# Patient Record
Sex: FEMALE | Race: WHITE | NOT HISPANIC OR LATINO | Employment: UNEMPLOYED | URBAN - METROPOLITAN AREA
[De-identification: names, ages, dates, MRNs, and addresses within clinical notes are randomized per-mention and may not be internally consistent; named-entity substitution may affect disease eponyms.]

---

## 2023-10-03 ENCOUNTER — APPOINTMENT (OUTPATIENT)
Dept: PRIMARY CARE | Facility: CLINIC | Age: 48
End: 2023-10-03
Payer: OTHER GOVERNMENT

## 2023-11-01 ENCOUNTER — OFFICE VISIT (OUTPATIENT)
Dept: PRIMARY CARE | Facility: CLINIC | Age: 48
End: 2023-11-01
Payer: OTHER GOVERNMENT

## 2023-11-01 VITALS
HEART RATE: 78 BPM | OXYGEN SATURATION: 98 % | WEIGHT: 137 LBS | SYSTOLIC BLOOD PRESSURE: 122 MMHG | TEMPERATURE: 97.7 F | DIASTOLIC BLOOD PRESSURE: 80 MMHG | HEIGHT: 63 IN | BODY MASS INDEX: 24.27 KG/M2

## 2023-11-01 DIAGNOSIS — Z12.31 ENCOUNTER FOR SCREENING MAMMOGRAM FOR MALIGNANT NEOPLASM OF BREAST: ICD-10-CM

## 2023-11-01 DIAGNOSIS — L98.9 BACK SKIN LESION: ICD-10-CM

## 2023-11-01 DIAGNOSIS — E78.5 HYPERLIPIDEMIA, UNSPECIFIED HYPERLIPIDEMIA TYPE: ICD-10-CM

## 2023-11-01 DIAGNOSIS — H02.849 SWELLING OF EYELID, UNSPECIFIED LATERALITY: ICD-10-CM

## 2023-11-01 DIAGNOSIS — R09.81 SINUS CONGESTION: ICD-10-CM

## 2023-11-01 DIAGNOSIS — R41.3 MEMORY CHANGES: ICD-10-CM

## 2023-11-01 DIAGNOSIS — Z13.1 SCREENING FOR DIABETES MELLITUS: ICD-10-CM

## 2023-11-01 DIAGNOSIS — Z00.00 WELLNESS EXAMINATION: Primary | ICD-10-CM

## 2023-11-01 PROBLEM — J45.20 INTRINSIC ASTHMA WITHOUT STATUS ASTHMATICUS (HHS-HCC): Status: ACTIVE | Noted: 2022-11-08

## 2023-11-01 PROBLEM — E61.1 IRON DEFICIENCY: Status: ACTIVE | Noted: 2022-11-08

## 2023-11-01 PROBLEM — D64.9 ANEMIA: Status: ACTIVE | Noted: 2022-11-08

## 2023-11-01 PROBLEM — E61.1 IRON DEFICIENCY: Status: RESOLVED | Noted: 2022-11-08 | Resolved: 2023-11-01

## 2023-11-01 PROBLEM — K58.1 IRRITABLE BOWEL SYNDROME WITH CONSTIPATION: Status: ACTIVE | Noted: 2022-11-08

## 2023-11-01 PROBLEM — E28.2 POLYCYSTIC OVARY SYNDROME: Status: ACTIVE | Noted: 2022-11-08

## 2023-11-01 PROBLEM — D64.9 ANEMIA: Status: RESOLVED | Noted: 2022-11-08 | Resolved: 2023-11-01

## 2023-11-01 PROCEDURE — 1036F TOBACCO NON-USER: CPT | Performed by: FAMILY MEDICINE

## 2023-11-01 PROCEDURE — 99396 PREV VISIT EST AGE 40-64: CPT | Performed by: FAMILY MEDICINE

## 2023-11-01 PROCEDURE — 99214 OFFICE O/P EST MOD 30 MIN: CPT | Performed by: FAMILY MEDICINE

## 2023-11-01 RX ORDER — BISMUTH SUBSALICYLATE 262 MG
1 TABLET,CHEWABLE ORAL DAILY
COMMUNITY

## 2023-11-01 RX ORDER — DIPHENHYDRAMINE HCL 25 MG
25-50 TABLET ORAL EVERY 4 HOURS PRN
Qty: 30 TABLET | Refills: 3 | Status: SHIPPED | OUTPATIENT
Start: 2023-11-01

## 2023-11-01 RX ORDER — CETIRIZINE HYDROCHLORIDE 10 MG/1
10 TABLET ORAL DAILY
Qty: 90 TABLET | Refills: 3 | Status: SHIPPED | OUTPATIENT
Start: 2023-11-01

## 2023-11-01 ASSESSMENT — ENCOUNTER SYMPTOMS
EYE REDNESS: 0
EYE DISCHARGE: 0
CHILLS: 0
VOICE CHANGE: 0
DIARRHEA: 0
EYE ITCHING: 0
VOMITING: 0
COUGH: 0
SORE THROAT: 0
FATIGUE: 0
DYSURIA: 0
RHINORRHEA: 0
SHORTNESS OF BREATH: 0
FEVER: 0
APPETITE CHANGE: 0
DIZZINESS: 0
CONSTIPATION: 0
PALPITATIONS: 0
SLEEP DISTURBANCE: 0
ABDOMINAL PAIN: 0
NAUSEA: 0

## 2023-11-01 NOTE — PROGRESS NOTES
"Subjective   Patient ID: Malena Person is a 47 y.o. female who presents for Annual Exam and Eye Pain (Bilat eye pain and swelling x 9 yrs off and on-Happened last night).    Malena presents for annual wellness exam and to address multiple concerns.     She has been having swelling in bilateral upper and lower eyelids. Happens intermittently. Seems to happen after being outside. Did see allergist in January of this year. Persistent symptoms despite allergy treatment. Also having intermittent pressure in sphenoid sinuses.     Also noticed that her memory is not as sharp. Sometimes can take her awhile to think of things. Also has been more stressed with possible move coming up.     Needs dermatology referral. Due for skin check.          Review of Systems   Constitutional:  Negative for appetite change, chills, fatigue and fever.   HENT:  Negative for congestion, rhinorrhea, sore throat and voice change.    Eyes:  Negative for discharge, redness, itching and visual disturbance.   Respiratory:  Negative for cough and shortness of breath.    Cardiovascular:  Negative for chest pain and palpitations.   Gastrointestinal:  Negative for abdominal pain, constipation, diarrhea, nausea and vomiting.   Genitourinary:  Negative for dysuria.   Skin:  Negative for rash.   Neurological:  Negative for dizziness.   Psychiatric/Behavioral:  Negative for sleep disturbance.        Objective   /80   Pulse 78   Temp 36.5 °C (97.7 °F)   Ht 1.6 m (5' 3\")   Wt 62.1 kg (137 lb)   SpO2 98%   BMI 24.27 kg/m²     Physical Exam  Constitutional:       General: She is not in acute distress.     Appearance: Normal appearance.   HENT:      Head: Normocephalic.      Nose: No congestion.      Mouth/Throat:      Mouth: Mucous membranes are moist.   Eyes:      Extraocular Movements: Extraocular movements intact.      Conjunctiva/sclera: Conjunctivae normal.   Cardiovascular:      Rate and Rhythm: Normal rate and regular rhythm.      " Heart sounds: No murmur heard.  Pulmonary:      Effort: Pulmonary effort is normal.      Breath sounds: No wheezing or rhonchi.   Abdominal:      Palpations: Abdomen is soft.      Tenderness: There is no abdominal tenderness.   Musculoskeletal:         General: No swelling or tenderness.   Skin:     General: Skin is warm and dry.   Neurological:      General: No focal deficit present.      Mental Status: She is alert.   Psychiatric:         Mood and Affect: Mood normal.         Behavior: Behavior normal.         Assessment/Plan   Diagnoses and all orders for this visit:  Wellness examination  Comments:  Mammogram ordered. Pap and colon cancer screening up-to-date.  Swelling of eyelid, unspecified laterality  Comments:  Possible allergic cause. Refer to ophthalmology to rule out other causes since limited improvement with allergy medication.  Orders:  -     Referral to Ophthalmology; Future  -     cetirizine (ZyrTEC) 10 mg tablet; Take 1 tablet (10 mg) by mouth once daily.  -     diphenhydrAMINE (Sominex) 25 mg tablet; Take 1-2 tablets (25-50 mg) by mouth every 4 hours if needed for allergies.  Sinus congestion  Comments:  Intermittent sinus issues. Unclear if related to eyelid swelling. Refer to ENT to see if sinusitis is contributing factor.  Orders:  -     Referral to ENT; Future  Back skin lesion  Comments:  Refer to deramtology for anual skin check. History biopsy of back skin lesions; being monitored.  Orders:  -     Referral to Dermatology  Memory changes  Comments:  Check TSH and B12 levels.  Orders:  -     Vitamin B12; Future  -     TSH with reflex to Free T4 if abnormal; Future  Hyperlipidemia, unspecified hyperlipidemia type  Comments:  Repeat lipid panel ordered.  Orders:  -     Lipid Panel; Future  Encounter for screening mammogram for malignant neoplasm of breast  -     BI mammo bilateral screening tomosynthesis; Future  Screening for diabetes mellitus  -     Comprehensive Metabolic Panel; Future

## 2023-11-07 ENCOUNTER — OFFICE VISIT (OUTPATIENT)
Dept: OPHTHALMOLOGY | Facility: CLINIC | Age: 48
End: 2023-11-07
Payer: OTHER GOVERNMENT

## 2023-11-07 DIAGNOSIS — H01.115 ALLERGIC DERMATITIS OF UPPER AND LOWER LIDS OF BOTH EYES: ICD-10-CM

## 2023-11-07 DIAGNOSIS — H01.112 ALLERGIC DERMATITIS OF UPPER AND LOWER LIDS OF BOTH EYES: ICD-10-CM

## 2023-11-07 DIAGNOSIS — H01.111 ALLERGIC DERMATITIS OF UPPER AND LOWER LIDS OF BOTH EYES: ICD-10-CM

## 2023-11-07 DIAGNOSIS — H02.849 SWELLING OF EYELID, UNSPECIFIED LATERALITY: Primary | ICD-10-CM

## 2023-11-07 DIAGNOSIS — H01.114 ALLERGIC DERMATITIS OF UPPER AND LOWER LIDS OF BOTH EYES: ICD-10-CM

## 2023-11-07 PROCEDURE — 99204 OFFICE O/P NEW MOD 45 MIN: CPT | Performed by: OPHTHALMOLOGY

## 2023-11-07 ASSESSMENT — CONF VISUAL FIELD
METHOD: COUNTING FINGERS
OD_INFERIOR_TEMPORAL_RESTRICTION: 0
OD_INFERIOR_NASAL_RESTRICTION: 0
OD_SUPERIOR_TEMPORAL_RESTRICTION: 0
OD_SUPERIOR_NASAL_RESTRICTION: 0
OS_NORMAL: 1
OS_SUPERIOR_TEMPORAL_RESTRICTION: 0
OD_NORMAL: 1
OS_INFERIOR_TEMPORAL_RESTRICTION: 0
OS_INFERIOR_NASAL_RESTRICTION: 0
OS_SUPERIOR_NASAL_RESTRICTION: 0

## 2023-11-07 ASSESSMENT — ENCOUNTER SYMPTOMS: EYES NEGATIVE: 1

## 2023-11-07 ASSESSMENT — TONOMETRY
OS_IOP_MMHG: 14
IOP_METHOD: TONOPEN
OD_IOP_MMHG: 14

## 2023-11-07 ASSESSMENT — SLIT LAMP EXAM - LIDS
COMMENTS: DERMATOCHALASIS - LOWER LID
COMMENTS: DERMATOCHALASIS - LOWER LID

## 2023-11-07 ASSESSMENT — VISUAL ACUITY
METHOD: SNELLEN - LINEAR
OS_SC: 20/30-2
OD_SC: 20/25

## 2023-11-07 ASSESSMENT — EXTERNAL EXAM - RIGHT EYE: OD_EXAM: NORMAL

## 2023-11-07 ASSESSMENT — EXTERNAL EXAM - LEFT EYE: OS_EXAM: NORMAL

## 2023-11-07 NOTE — PROGRESS NOTES
Assessment/Plan   Diagnoses and all orders for this visit:  Swelling of eyelid, unspecified laterality  -Bilateral upper and lower eyelid swelling that is episodic without known etiology  -Most likely allergic in nature. Prior TSH done in 2022 was within normal limits, no obvious proptosis on exam today to suggest JERSON as etiology.  -Recommend further evaluation with allergist or immunologist. Will refer patient to Oculoplastics surgeon Dr. Woody Gomes. She is scheduled with ENT later this week.

## 2023-11-07 NOTE — LETTER
November 7, 2023    Sherie Coronado,   [address]  [address]  [place] [address]    Patient: Malena Person   YOB: 1975   Date of Visit: 11/7/2023       Dear Dr. Sherie Coronado, DO:    Thank you for referring Malena Person to me for evaluation. Below are the relevant portions of the exam, along with my assessment and plan of care.    Vision readings for this visit:  Visual Acuity (Snellen - Linear)         Right Left    Dist sc 20/25 20/30-2          Tonometry (Tonopen, 1:32 PM)         Right Left    Pressure 14 14            Assessment/Plan:  Diagnoses and all orders for this visit:  Swelling of eyelid, unspecified laterality  -Bilateral upper and lower eyelid swelling that is episodic without known etiology  -Most likely allergic in nature. Prior TSH done in 2022 was within normal limits, no obvious proptosis on exam today to suggest JERSON as etiology.  -Recommend further evaluation with allergist or immunologist. Will refer patient to Oculoplastics surgeon Dr. Woody Gomes. She is scheduled with ENT later this week.        If you have questions, please do not hesitate to call me. I look forward to following Malnea along with you.         Sincerely,        Jayshree Dwyer MD        CC:   No Recipients

## 2023-11-09 ENCOUNTER — ANCILLARY PROCEDURE (OUTPATIENT)
Dept: RADIOLOGY | Facility: CLINIC | Age: 48
End: 2023-11-09
Payer: OTHER GOVERNMENT

## 2023-11-09 ENCOUNTER — LAB (OUTPATIENT)
Dept: LAB | Facility: LAB | Age: 48
End: 2023-11-09
Payer: OTHER GOVERNMENT

## 2023-11-09 VITALS — WEIGHT: 140 LBS | HEIGHT: 63 IN | BODY MASS INDEX: 24.8 KG/M2

## 2023-11-09 DIAGNOSIS — Z13.1 SCREENING FOR DIABETES MELLITUS: ICD-10-CM

## 2023-11-09 DIAGNOSIS — Z12.31 ENCOUNTER FOR SCREENING MAMMOGRAM FOR MALIGNANT NEOPLASM OF BREAST: ICD-10-CM

## 2023-11-09 DIAGNOSIS — E78.5 HYPERLIPIDEMIA, UNSPECIFIED HYPERLIPIDEMIA TYPE: ICD-10-CM

## 2023-11-09 DIAGNOSIS — R41.3 MEMORY CHANGES: ICD-10-CM

## 2023-11-09 LAB
ALBUMIN SERPL BCP-MCNC: 4.1 G/DL (ref 3.4–5)
ALP SERPL-CCNC: 41 U/L (ref 33–110)
ALT SERPL W P-5'-P-CCNC: 11 U/L (ref 7–45)
ANION GAP SERPL CALC-SCNC: 9 MMOL/L (ref 10–20)
AST SERPL W P-5'-P-CCNC: 17 U/L (ref 9–39)
BILIRUB SERPL-MCNC: 0.5 MG/DL (ref 0–1.2)
BUN SERPL-MCNC: 15 MG/DL (ref 6–23)
CALCIUM SERPL-MCNC: 8.8 MG/DL (ref 8.6–10.3)
CHLORIDE SERPL-SCNC: 105 MMOL/L (ref 98–107)
CHOLEST SERPL-MCNC: 183 MG/DL (ref 0–199)
CHOLESTEROL/HDL RATIO: 2.9
CO2 SERPL-SCNC: 28 MMOL/L (ref 21–32)
CREAT SERPL-MCNC: 0.75 MG/DL (ref 0.5–1.05)
GFR SERPL CREATININE-BSD FRML MDRD: >90 ML/MIN/1.73M*2
GLUCOSE SERPL-MCNC: 81 MG/DL (ref 74–99)
HDLC SERPL-MCNC: 62.9 MG/DL
LDLC SERPL CALC-MCNC: 107 MG/DL
NON HDL CHOLESTEROL: 120 MG/DL (ref 0–149)
POTASSIUM SERPL-SCNC: 4 MMOL/L (ref 3.5–5.3)
PROT SERPL-MCNC: 6.2 G/DL (ref 6.4–8.2)
SODIUM SERPL-SCNC: 138 MMOL/L (ref 136–145)
TRIGL SERPL-MCNC: 68 MG/DL (ref 0–149)
TSH SERPL-ACNC: 0.75 MIU/L (ref 0.44–3.98)
VIT B12 SERPL-MCNC: 1165 PG/ML (ref 211–911)
VLDL: 14 MG/DL (ref 0–40)

## 2023-11-09 PROCEDURE — 77067 SCR MAMMO BI INCL CAD: CPT | Performed by: RADIOLOGY

## 2023-11-09 PROCEDURE — 84443 ASSAY THYROID STIM HORMONE: CPT

## 2023-11-09 PROCEDURE — 80053 COMPREHEN METABOLIC PANEL: CPT

## 2023-11-09 PROCEDURE — 82607 VITAMIN B-12: CPT

## 2023-11-09 PROCEDURE — 80061 LIPID PANEL: CPT

## 2023-11-09 PROCEDURE — 77063 BREAST TOMOSYNTHESIS BI: CPT

## 2023-11-09 PROCEDURE — 77063 BREAST TOMOSYNTHESIS BI: CPT | Performed by: RADIOLOGY

## 2023-11-09 PROCEDURE — 36415 COLL VENOUS BLD VENIPUNCTURE: CPT

## 2023-11-13 ENCOUNTER — TELEPHONE (OUTPATIENT)
Dept: PRIMARY CARE | Facility: CLINIC | Age: 48
End: 2023-11-13
Payer: OTHER GOVERNMENT

## 2023-11-13 DIAGNOSIS — H02.849 SWELLING OF EYELID, UNSPECIFIED LATERALITY: Primary | ICD-10-CM

## 2023-11-13 NOTE — TELEPHONE ENCOUNTER
----- Message from Malena Person sent at 11/13/2023 12:39 PM EST -----  Regarding: Referral for Oculoplastics   Contact: [phone]  Please, send a referral from you specifically to see Dr Woody Eubanks that Dr Dwyer wants me to see for tear duct issues/2nd opinion..Rajendra needs a fax to [phone] before I can make my appointment- Rajendra said it needs to come from my PCM not Dr Dwyer

## 2023-11-27 ENCOUNTER — TELEPHONE (OUTPATIENT)
Dept: PRIMARY CARE | Facility: CLINIC | Age: 48
End: 2023-11-27
Payer: OTHER GOVERNMENT

## 2023-11-27 NOTE — TELEPHONE ENCOUNTER
Patient was sent to specialist from [facility] and they are sending her to Dr. Woody Eubanks Ophthalmologist because of her eye ducts her appt is 12/4/2023 needs Referral for [facility]. Patient gave number [phone]  Please call patient when this is done.

## 2023-11-28 NOTE — TELEPHONE ENCOUNTER
Per fax from [facility], referral has been approved for 5 visits valid 11/28/23 to 11/27/24.  Auth #[id number]  Called pt and lmom informing pt of auth

## 2023-11-29 NOTE — PATIENT INSTRUCTIONS
I highly recommend that you keep your scheduled appointment with Dr. Woody Gomes on Monday, December 4. Dr. Gomes is subspecialist in Occuploplastics.    No follow up needed with Dr. Cabrera. Please feel free to contact his office by calling [phone] with any questions.    Scribe Attestation  By signing my name below, I, Ayo Kemp, attest that this documentation has been prepared under the direction and in the presence of Joe Cabrera MD. All medical record entries made by the Scribe were at my direction or personally dictated by me. I have reviewed the chart and agree that the record accurately reflects my personal performance of the history, physical exam, discussion and plan.

## 2023-11-30 ENCOUNTER — OFFICE VISIT (OUTPATIENT)
Dept: OTOLARYNGOLOGY | Facility: CLINIC | Age: 48
End: 2023-11-30
Payer: OTHER GOVERNMENT

## 2023-11-30 VITALS — BODY MASS INDEX: 24.8 KG/M2 | HEIGHT: 63 IN | WEIGHT: 140 LBS

## 2023-11-30 DIAGNOSIS — R09.81 SINUS CONGESTION: ICD-10-CM

## 2023-11-30 DIAGNOSIS — J30.89 ALLERGIC RHINITIS DUE TO OTHER ALLERGIC TRIGGER, UNSPECIFIED SEASONALITY: Primary | ICD-10-CM

## 2023-11-30 DIAGNOSIS — R60.0 PERIORBITAL EDEMA OF BOTH EYES: ICD-10-CM

## 2023-11-30 PROCEDURE — 1036F TOBACCO NON-USER: CPT | Performed by: OTOLARYNGOLOGY

## 2023-11-30 PROCEDURE — 99203 OFFICE O/P NEW LOW 30 MIN: CPT | Performed by: OTOLARYNGOLOGY

## 2023-11-30 PROCEDURE — 31231 NASAL ENDOSCOPY DX: CPT | Performed by: OTOLARYNGOLOGY

## 2023-11-30 NOTE — LETTER
November 30, 2023     Sherie Coronado DO  [address]  [address] [address]    Patient: Malena Person   YOB: 1975   Date of Visit: 11/30/2023       Dear Dr. Sherie Coronado DO:    Thank you for referring Malena Person to me for evaluation. Below are my notes for this consultation.  If you have questions, please do not hesitate to call me. I look forward to following your patient along with you.       Sincerely,     Joe Cabrera MD      CC: MD Woody Gamez MD  ______________________________________________________________________________________    HPI  Malena Person is a 47 y.o. female, referred by Sherie Coronado, *. She presents with sinus/nose problems that began about 10 years ago. The patient describes her sinus/nose symptoms as bilateral eye swelling and headaches. She notes that this has happened at least 46 times in the last 10 years. In the past, she has gone to the ER and urgent care for this. When the swelling occurs it typically lasts 14-15 hours and resolves with holding a cold compress over her face and taking multiple Benadryl. She wanted to explore ENT because she develops pressure in between her eyes and will see a small white dot near her left eye. She notes that she has seen a dermatologist and an allergist to further explore without answers. She endorses occasional reduced visual acuity caused by migraines. Patient denies rhinorrhea, loss of taste, loss of smell, and epistaxis. The patient has taken Benadryl medications to alleviate the problem. The medication benadryl has helped. She does not have improvement with oral steroids. The patient has not tried nasal saline irrigations. She has a history of allergic rhinitis or allergies. She has received allergy shots in the past. She denies a history of aspirin sensitivity. She reports 0 sinus infections per year requiring antibiotic treatment.    The patient  reports seeing Dr. Malick Gupta several months ago, who stated that she was not allergic to anything. He did not recommend allergy shots and stated she could get a prescription for Zyrtec from him.    History of asthma  No history of prior nasal/sinus surgery or procedure    Past Medical History:   Diagnosis Date   • Allergic rhinitis    • Anemia 11/08/2022   • Asthma    • Headache 2021   • Iron deficiency 11/08/2022   • Migraine    History of asthma    Past Surgical History:   Procedure Laterality Date   • TONSILLECTOMY         Social History     Socioeconomic History   • Marital status:      Spouse name: Not on file   • Number of children: Not on file   • Years of education: Not on file   • Highest education level: Not on file   Occupational History   • Not on file   Tobacco Use   • Smoking status: Never   • Smokeless tobacco: Never   Vaping Use   • Vaping Use: Never used   Substance and Sexual Activity   • Alcohol use: Yes     Alcohol/week: 5.0 standard drinks of alcohol     Types: 5 Standard drinks or equivalent per week   • Drug use: Never   • Sexual activity: Yes     Partners: Male     Birth control/protection: None   Other Topics Concern   • Not on file   Social History Narrative   • Not on file     Social Determinants of Health     Financial Resource Strain: Not on file   Food Insecurity: Not on file   Transportation Needs: Not on file   Physical Activity: Not on file   Stress: Not on file   Social Connections: Not on file   Intimate Partner Violence: Not on file   Housing Stability: Not on file       Family History   Problem Relation Name Age of Onset   • Diabetes Maternal Grandmother Yvette underwood    • Diabetes Paternal Grandmother Jackie Chary        Allergies   Allergen Reactions   • Bronopol Unknown   • [place] Unknown     [place] dichloride   • Gold [address] Unknown   • Methyldibromoglutaronitrile Unknown   • Methylisothiazolinone Unknown   • Nickel Unknown   • Oleamidopropyl  Dimethylamine Unknown   • Other Unknown     Fragrance mix   • Oxybenzone(Benzophenone 3) Unknown   • Penicillin V Hives       Medication Documentation Review Audit       Reviewed by IFRAH Meléndez (Nurse Practitioner) on 11/30/23 at 1120      Medication Order Taking? Sig Documenting Provider Last Dose Status   ascorbic acid (VITAMIN C ORAL) [id number] Yes Take by mouth. Historical Provider, MD Taking Active   cetirizine (ZyrTEC) 10 mg tablet [id number] Yes Take 1 tablet (10 mg) by mouth once daily. Sherie Coronado, DO Taking Active   cholecalciferol, vitamin D3, (VITAMIN D3 ORAL) [id number] Yes Take by mouth. Historical Provider, MD Taking Active   collagen/biotin/ascorbic acid (COLLAGEN 1500 PLUS C ORAL) [id number] Yes Take 1 capsule by mouth once daily. Historical Provider, MD Taking Active   diphenhydrAMINE (Sominex) 25 mg tablet [id number] Yes Take 1-2 tablets (25-50 mg) by mouth every 4 hours if needed for allergies. Sherie Coronado,  Taking Active   multivitamin tablet [id number] Yes Take 1 tablet by mouth once daily. Historical Provider, MD Taking Active                    Review of Systems  Negative for constitutional, eyes, cardiac, pulmonary, hepatic, renal, digestive, hematologic, epileptic, syncopal, musculo-skeletal, mental health, integumentary, hypertensive, lipid, arthritic, diabetic, thyroid or neurologic disorders (except as listed in the HPI, PMH and Problem List).    Assessment  47 y.o. female h/o asthma with symptoms of bilateral recurrent periorbital swelling.    11/30/23 - Sinuses are entirely normal and based on history and endoscopic exam do not appear to be a cause of her issues. Pt was recommended to keep scheduled appointment with Woody Gomes on 12/4. As well advised to see Dr. Gupta specifically for this issue.     Plan  Nasal endoscopy. Findings: as noted.    Reassurance and counseling was provided to the patient, and her condition was extensively discussed,  including as noted below:  Patient was strongly recommended to keep her scheduled appointment with Dr. Woody Gomes at his private practice on Monday 12/4.   Per the patient's request, I will provide Dr. Woody Gomes with a copy of these notes.  I offered the patient a CT scan of the sinuses, but I think this would be very low yield given the normal endoscopic exam of her sinonasal tracts and the history not lining up with a sinus related cause  I did encourage her to contact Dr. Gupta to see him specifically for the recurrent episodes of recurrent periorbital swelling as it seems much more aligned with an A/I problem  Follow up with me as needed.    Referring Provider: Sherie Coronado, *  I will provide a report to the referring provider via the electronic medical record or US mail.    Joe Cabrera MD [facility]  Division of Rhinology, Sinus, and Skull Base Surgery       Exam  General: This is a healthy appearing female who appears her stated age. The patient is alert and appropriately verbally conversant without hoarseness.    Face: The face was inspected and no cutaneous masses or lesions were visualized. There was no erythema or edema noted. Facial movement was symmetric without weakness. No skin lesions were detected. There was no sinus tenderness elicited. The parotid and submandibular glands were normal to palpation.    Eyes: Extra-ocular muscle function was intact. No nystagmus was observed. Pupils were equal.    Cranial Nerves: Cranial nerves II, III, IV, and VI were noted to be intact via extra-ocular muscle movement testing. Cranial nerve VII noted to be intact and symmetric by facial movement. Cranial nerve VIII was tested with whispered voice examination and revealed symmetric hearing. Cranial nerves IX and X noted to be intact by gag reflex and palatal movement. Cranial nerve XII noted to be intact by active and symmetric tongue movement.    Nose: Examination of the nose revealed the nasal  dorsum to be midline. Intranasal exam reveals the septum is relatively straight. The inferior turbinates were normal. No masses, polyps, mucopus, or other lesion on anterior rhinoscopy. See below procedure note as applicable for further exam.    Oral Cavity: Examination of the oral cavity revealed no mass lesions nor infection. The palate was noted to be intact without evidence of clefting. The tongue exhibited normal mobility. Mucosa was moist without lesion. The lips were free of lesion. Gums were free of inflammation. Dentition: normal without obvious infection or inflammation.    Oropharynx: The oral pharynx was free of mass lesion or mucosal abnormality. The palate was noted to be without lesion. The uvula was normal appearing. The tonsils were surgically absent.    Ears: Examination of the ears revealed that the auricles were normally formed with no lesions. The external auditory canals were cleaned of any obstructing cerumen. The tympanic membranes were intact and freely mobile to pneumatoscopy. There are no significant retraction pockets. There is no inflammation visualized. No effusions are seen.    Neck: Visualization and palpation of the neck revealed no mass lesions, no thyromegaly or thyroid masses. No skin lesions or inflammatory processes were detected. The cervical musculature was normal to palpation.    Cervical Lymphatics: There were no palpable lymph nodes in the posterior triangle, submandibular triangle, jugulodigastric region, or central neck.    CV: peripheral perfusion intact, no cyanosis, clubbing or edema of extremities.    Respiratory: Normal inspiration and expiration and chest wall expansion, no use of accessory muscles to breathe, no stridor or stertor.    Procedure Note:  Procedure: Nasal endoscopy - diagnostic  Indication: concern for chronic sinusitis  Informed consent obtained: risks, benefits, alternatives, and expectations discussed with patient and the patient wishes to  proceed.    Findings: After topical decongestion with decongestant and anesthetic spray, nasal endoscopy was performed using an endoscope. The septum was relatively straight. The inferior turbinates were normal. The middle turbinates appeared healthy. The middle meatus is free of purulence, masses, lesions or polyps. The superior meatus and sphenoethmoid recess are clear bilaterally. The nasal passageway is patent. The nasopharynx was clear. There were no complications and the patient tolerated the procedure well.    Scribe Attestation  By signing my name below, I, Ayo Kemp, attest that this documentation has been prepared under the direction and in the presence of Joe Cabrera MD. All medical record entries made by the Scribe were at my direction or personally dictated by me. I have reviewed the chart and agree that the record accurately reflects my personal performance of the history, physical exam, discussion and plan.

## 2023-12-13 ENCOUNTER — OFFICE VISIT (OUTPATIENT)
Dept: PRIMARY CARE | Facility: CLINIC | Age: 48
End: 2023-12-13
Payer: OTHER GOVERNMENT

## 2023-12-13 VITALS
DIASTOLIC BLOOD PRESSURE: 70 MMHG | SYSTOLIC BLOOD PRESSURE: 122 MMHG | HEART RATE: 79 BPM | TEMPERATURE: 97.5 F | WEIGHT: 141 LBS | OXYGEN SATURATION: 97 % | BODY MASS INDEX: 25.38 KG/M2

## 2023-12-13 DIAGNOSIS — H02.31 BLEPHAROCHALASIS OF UPPER AND LOWER EYELIDS OF BOTH EYES: Primary | ICD-10-CM

## 2023-12-13 DIAGNOSIS — H02.34 BLEPHAROCHALASIS OF UPPER AND LOWER EYELIDS OF BOTH EYES: Primary | ICD-10-CM

## 2023-12-13 DIAGNOSIS — H02.35 BLEPHAROCHALASIS OF UPPER AND LOWER EYELIDS OF BOTH EYES: Primary | ICD-10-CM

## 2023-12-13 DIAGNOSIS — R79.89 LOW SERUM TOTAL PROTEIN LEVEL: ICD-10-CM

## 2023-12-13 DIAGNOSIS — Z13.0 SCREENING FOR DEFICIENCY ANEMIA: ICD-10-CM

## 2023-12-13 DIAGNOSIS — H02.32 BLEPHAROCHALASIS OF UPPER AND LOWER EYELIDS OF BOTH EYES: Primary | ICD-10-CM

## 2023-12-13 DIAGNOSIS — R74.8 ELEVATED VITAMIN B12 LEVEL: ICD-10-CM

## 2023-12-13 PROCEDURE — 99214 OFFICE O/P EST MOD 30 MIN: CPT | Performed by: FAMILY MEDICINE

## 2023-12-13 PROCEDURE — 3008F BODY MASS INDEX DOCD: CPT | Performed by: FAMILY MEDICINE

## 2023-12-13 PROCEDURE — 1036F TOBACCO NON-USER: CPT | Performed by: FAMILY MEDICINE

## 2023-12-13 RX ORDER — DOXYCYCLINE HYCLATE 50 MG/1
50 CAPSULE ORAL 2 TIMES DAILY
COMMUNITY
Start: 2023-12-06 | End: 2024-03-08 | Stop reason: WASHOUT

## 2023-12-13 ASSESSMENT — ENCOUNTER SYMPTOMS: COUGH: 0

## 2023-12-13 NOTE — PROGRESS NOTES
Subjective   Patient ID: Malena Person is a 47 y.o. female who presents for Results (Discuss lab results).    Malena presents to review labs. She did see a specialist who diagnosed her with blepharochalasis. Is on trial of doxycycline to see if it improves her symptoms.          Review of Systems   HENT:  Negative for congestion.    Respiratory:  Negative for cough.        Objective   /70   Pulse 79   Temp 36.4 °C (97.5 °F)   Wt 64 kg (141 lb)   SpO2 97%   BMI 25.38 kg/m²     Physical Exam  Constitutional:       General: She is not in acute distress.     Appearance: Normal appearance.   HENT:      Head: Normocephalic.   Pulmonary:      Effort: Pulmonary effort is normal.   Neurological:      General: No focal deficit present.      Mental Status: She is alert.   Psychiatric:         Mood and Affect: Mood normal.         Assessment/Plan   Diagnoses and all orders for this visit:  Blepharochalasis of upper and lower eyelids of both eyes  Comments:  Continue doxycycline.  Low serum total protein level  Comments:  Mildly low protein. Refer to nutrition per patient request.  Orders:  -     Referral to Nutrition Services; Future  Elevated vitamin B12 level  Comments:  Change B12 supplement to every other day. Plan recheck of level in January.  Orders:  -     Vitamin B12; Future  BMI 25.0-25.9,adult  -     Referral to Nutrition Services; Future  Screening for deficiency anemia  -     Ferritin; Future  -     Iron and TIBC; Future  -     CBC and Auto Differential; Future

## 2024-01-03 ENCOUNTER — OFFICE VISIT (OUTPATIENT)
Dept: DERMATOLOGY | Facility: CLINIC | Age: 49
End: 2024-01-03
Payer: OTHER GOVERNMENT

## 2024-01-03 DIAGNOSIS — D18.01 HEMANGIOMA OF SKIN: ICD-10-CM

## 2024-01-03 DIAGNOSIS — L82.1 SEBORRHEIC KERATOSIS: ICD-10-CM

## 2024-01-03 DIAGNOSIS — Z12.83 ENCOUNTER FOR SCREENING FOR MALIGNANT NEOPLASM OF SKIN: Primary | ICD-10-CM

## 2024-01-03 DIAGNOSIS — L81.4 LENTIGO: ICD-10-CM

## 2024-01-03 DIAGNOSIS — D22.9 MELANOCYTIC NEVUS, UNSPECIFIED LOCATION: ICD-10-CM

## 2024-01-03 PROCEDURE — 3008F BODY MASS INDEX DOCD: CPT | Performed by: NURSE PRACTITIONER

## 2024-01-03 PROCEDURE — 99203 OFFICE O/P NEW LOW 30 MIN: CPT | Performed by: NURSE PRACTITIONER

## 2024-01-03 PROCEDURE — 1036F TOBACCO NON-USER: CPT | Performed by: NURSE PRACTITIONER

## 2024-01-05 NOTE — PROGRESS NOTES
Subjective     Malena Person is a 48 y.o. female who presents for the following: Skin Check (Patient presents to office today for FBSE. PMHX insignificant.).     Review of Systems:  No other skin or systemic complaints other than what is documented elsewhere in the note.    The following portions of the chart were reviewed this encounter and updated as appropriate:  Tobacco  Allergies  Meds  Problems  Med Hx  Surg Hx  Fam Hx         Skin Cancer History  No skin cancer on file.      Specialty Problems    None       Objective   Well appearing patient in no apparent distress; mood and affect are within normal limits.    A full examination was performed including scalp, head, eyes, ears, nose, lips, neck, chest, axillae, abdomen, back, buttocks, bilateral upper extremities, bilateral lower extremities, hands, feet, fingers, toes, fingernails, and toenails. All findings within normal limits unless otherwise noted below.    Assessment/Plan   1. Encounter for screening for malignant neoplasm of skin  Scattered benign lesions    - Protective measures, such as avoiding skin exposure to sunlight during peak sun hours (10 AM to 3 PM), wearing protective clothing, and applying high-SPF sunscreen, are essential for reducing exposure to harmful ultraviolet (UV) light.  - Monthly self-examination of the skin is helpful to detect new lesions or changes in existing lesions.  - Discussed signs and symptoms of sun-related skin cancers.   - Make sure your moles are not signs of skin cancer (melanoma). Remember the ABCDEs of melanoma lesions:  A - Asymmetry: One half of the lesion does not mirror the other half.  B - Border: The borders are irregular or vague (indistinct).  C - Color: More than one color may be noted within the mole.  D - Diameter: Size greater than 6 mm (roughly the size of a pencil eraser) may be concerning.  E - Evolving: Notable changes in the lesion over time are suspicious signs for skin cancer.    2.  Hemangioma of skin  Violaceous/red papule with maroon lagoons     - A cherry hemangioma is a small macule (small, flat, smooth area) or papule (small, solid bump) formed from an overgrowth of tiny blood vessels in the skin. Cherry hemangiomas are characteristically red or purplish in color. They often first appear in middle adulthood and usually increase in number with age. Cherry hemangiomas are noncancerous (benign) and are common in adults.  - Lesions are benign, reassured patient.     3. Seborrheic keratosis  Stuck on verrucous, tan-brown papules and plaques.      Although Seborrheic Keratoses can be troublesome and unsightly, they are entirely benign.  Removal of Seborrheic Keratoses is considered a cosmetic procedure. Removal is typically performed using liquid nitrogen cryotherapy.  Treatment of current lesions does not prevent the development of new Seborrheic Keratoses in the future.    4. Melanocytic nevus, unspecified location  Uniform pigmented macule(s)/papule(s) with reassuring findings on dermoscopy    -Discussed nature of condition  -Reassurance, benign-appearing features on examination today  -Recommend continued observation    5. Lentigo  Scattered tan macules in sun-exposed areas.    A solar lentigo (plural, solar lentigines), sometimes called an age spot or liver spot, is a brown macule (small, flat, smooth area of skin) caused by chronic sun or artificial ultraviolet (UV) light exposure. There may be just one lentigo or there may be multiple. This type of lentigo is different from lentigo simplex (discussed separately) because it is caused by exposure to UV light. Solar lentigines are benign, but they do indicate excessive sun exposure, a risk factor for the development of skin cancer.  Lesions are benign, no treatment needed.

## 2024-01-17 ENCOUNTER — APPOINTMENT (OUTPATIENT)
Dept: NUTRITION | Facility: CLINIC | Age: 49
End: 2024-01-17
Payer: OTHER GOVERNMENT

## 2024-01-17 ENCOUNTER — TELEPHONE (OUTPATIENT)
Dept: PRIMARY CARE | Facility: CLINIC | Age: 49
End: 2024-01-17

## 2024-01-17 NOTE — TELEPHONE ENCOUNTER
Patient states Dermatology ,ENT,Oculoplastics referral was never but in for her? Also is asking for Rx refill on   cetirizine (ZyrTEC) 10 mg tablet  [facility]

## 2024-01-17 NOTE — TELEPHONE ENCOUNTER
Pt has already seen ENT and dermatology.  Notes in chart.  Under the referrals for their appts states no referral required.  Referral to occuloplastics was already done and is scanned into chart already.

## 2024-01-23 ENCOUNTER — TELEPHONE (OUTPATIENT)
Dept: PRIMARY CARE | Facility: CLINIC | Age: 49
End: 2024-01-23
Payer: OTHER GOVERNMENT

## 2024-01-23 DIAGNOSIS — J45.909 INTRINSIC ASTHMA WITHOUT STATUS ASTHMATICUS WITHOUT COMPLICATION, UNSPECIFIED ASTHMA SEVERITY (HHS-HCC): Primary | ICD-10-CM

## 2024-01-23 NOTE — TELEPHONE ENCOUNTER
Called pt to see what phone # she was calling for  because we have that they will not accept referral requests via phone call and that it must either be submitted online (which we do not have access to) or via fax.  Pt states she called #[phone] option #2 and was told that is the phone # and option we need to select for physician calling for referral.  Called [facility] and spoke with Mila and they will not accept referrals over the phone and that pt was given incorrect information and that we can submit either online or via fax.  Referral form completed and faxed to [facility] as ASAP.  Waiting on auth.  Called pt and pt informed of status of referral.

## 2024-01-23 NOTE — TELEPHONE ENCOUNTER
Fax received from [facility] referral approved.  Referral approval faxed to Dr. Gupta's office and called pt to let her know of approval

## 2024-01-23 NOTE — TELEPHONE ENCOUNTER
Pt called stating she needs a referral for Dr. August - Allergist. ASAP. Pt states she has appt tomorrow at 345p and does not want to RS.   Pt states we MUST call  to get referral approved by tomorrow. Call pt when done Thx

## 2024-01-25 ENCOUNTER — OFFICE VISIT (OUTPATIENT)
Dept: ALLERGY | Facility: CLINIC | Age: 49
End: 2024-01-25
Payer: OTHER GOVERNMENT

## 2024-01-25 VITALS — SYSTOLIC BLOOD PRESSURE: 111 MMHG | DIASTOLIC BLOOD PRESSURE: 82 MMHG | HEART RATE: 63 BPM

## 2024-01-25 DIAGNOSIS — T78.3XXA ANGIOEDEMA, INITIAL ENCOUNTER: Primary | ICD-10-CM

## 2024-01-25 DIAGNOSIS — J30.89 ALLERGIC RHINITIS DUE TO OTHER ALLERGIC TRIGGER, UNSPECIFIED SEASONALITY: ICD-10-CM

## 2024-01-25 PROCEDURE — 3008F BODY MASS INDEX DOCD: CPT | Performed by: ALLERGY & IMMUNOLOGY

## 2024-01-25 PROCEDURE — 1036F TOBACCO NON-USER: CPT | Performed by: ALLERGY & IMMUNOLOGY

## 2024-01-25 PROCEDURE — 99214 OFFICE O/P EST MOD 30 MIN: CPT | Performed by: ALLERGY & IMMUNOLOGY

## 2024-01-25 PROCEDURE — 95024 IQ TESTS W/ALLERGENIC XTRCS: CPT | Performed by: ALLERGY & IMMUNOLOGY

## 2024-01-25 RX ORDER — PREDNISONE 20 MG/1
TABLET ORAL
Qty: 15 TABLET | Refills: 0 | Status: SHIPPED | OUTPATIENT
Start: 2024-01-25 | End: 2024-02-06 | Stop reason: SDUPTHER

## 2024-01-25 NOTE — PROGRESS NOTES
Subjective   Patient ID:   [id number]   Malena Person is a 48 y.o. female who presents for Allergies (Eye swelling and pressure ).    Chief Complaint   Patient presents with    Allergies     Eye swelling and pressure           HPI  This patient is here to evaluate for:    For the past 9 years, she has had ocular rash  Had pics and videos  Pic shows severe left ocular swelling.    Feels pressure inner eyes.   Typically she is outdoors.   Pressure lasts 15 mins.  Then swelling occurs   And peaks in an hr  Lasts 46hrs (min) up to a few days.     Ice pack, 3-4 benadryls, and then tries to sleep elevated; wedge or in a recliner.  Last time was Halloween.    No itching or pain.     Happens a couple times per year in clusters.     No particular location  D[place]c[address] [place], [place], no partic    After [place] to her son, [place]    Feels like she can smell    She went off all makeup, then changed it,   Found out she's allergic to fragrance mix, gold, nickel, sunscreen and but no help after avoiding them.  Pt not interested in additional contact derm testing.  Also, they are a  family and she will be [place] in a few months.    She was dx by Optho with Blepharochalasis    Placed on doxycycline mg bid x 2 months.       Review of Systems   All other systems reviewed and are negative.        Objective     /82 (BP Location: Right arm, Patient Position: Sitting, BP Cuff Size: Adult)   Pulse 63      Physical Exam  Constitutional:       General: She is not in acute distress.     Appearance: Normal appearance. She is not ill-appearing.   HENT:      Head: Normocephalic and atraumatic.      Right Ear: Tympanic membrane, ear canal and external ear normal.      Left Ear: Tympanic membrane, ear canal and external ear normal.      Nose: Nose normal. No congestion or rhinorrhea.      Mouth/Throat:      Mouth: Mucous membranes are moist.      Pharynx: Oropharynx is clear. No  oropharyngeal exudate or posterior oropharyngeal erythema.   Eyes:      General:         Right eye: No discharge.         Left eye: No discharge.      Conjunctiva/sclera: Conjunctivae normal.   Cardiovascular:      Rate and Rhythm: Normal rate and regular rhythm.      Heart sounds: Normal heart sounds. No murmur heard.     No friction rub. No gallop.   Pulmonary:      Effort: Pulmonary effort is normal. No respiratory distress.      Breath sounds: Normal breath sounds. No stridor. No wheezing, rhonchi or rales.   Chest:      Chest wall: No tenderness.   Abdominal:      General: Abdomen is flat.      Palpations: Abdomen is soft.   Musculoskeletal:         General: Normal range of motion.      Cervical back: Normal range of motion and neck supple.   Lymphadenopathy:      Cervical: No cervical adenopathy.   Skin:     General: Skin is warm and dry.      Findings: No erythema, lesion or rash.   Neurological:      General: No focal deficit present.      Mental Status: She is alert. Mental status is at baseline.   Psychiatric:         Mood and Affect: Mood normal.         Behavior: Behavior normal.         Thought Content: Thought content normal.         Judgment: Judgment normal.            Current Outpatient Medications   Medication Sig Dispense Refill    ascorbic acid (VITAMIN C ORAL) Take by mouth.      cetirizine (ZyrTEC) 10 mg tablet Take 1 tablet (10 mg) by mouth once daily. 90 tablet 3    cholecalciferol, vitamin D3, (VITAMIN D3 ORAL) Take by mouth.      collagen/biotin/ascorbic acid (COLLAGEN 1500 PLUS C ORAL) Take 1 capsule by mouth once daily.      diphenhydrAMINE (Sominex) 25 mg tablet Take 1-2 tablets (25-50 mg) by mouth every 4 hours if needed for allergies. 30 tablet 3    doxycycline (Vibramycin) 50 mg capsule Take 1 capsule (50 mg) by mouth 2 times a day.      multivitamin tablet Take 1 tablet by mouth once daily.       No current facility-administered medications for this visit.       Summary of the labs  over the past 6 months:    Lab on 11/09/2023   Component Date Value Ref Range Status    Vitamin B12 11/09/2023 1,165 (H)  211 - 911 pg/mL Final    Thyroid Stimulating Hormone 11/09/2023 0.75  0.44 - 3.98 mIU/L Final    Cholesterol 11/09/2023 183  0 - 199 mg/dL Final    HDL-Cholesterol 11/09/2023 62.9  mg/dL Final    Cholesterol/HDL Ratio 11/09/2023 2.9   Final    LDL Calculated 11/09/2023 107 (H)  <=99 mg/dL Final    VLDL 11/09/2023 14  0 - 40 mg/dL Final    Triglycerides 11/09/2023 68  0 - 149 mg/dL Final    Non HDL Cholesterol 11/09/2023 120  0 - 149 mg/dL Final    Glucose 11/09/2023 81  74 - 99 mg/dL Final    Sodium 11/09/2023 138  136 - 145 mmol/L Final    Potassium 11/09/2023 4.0  3.5 - 5.3 mmol/L Final    Chloride 11/09/2023 105  98 - 107 mmol/L Final    Bicarbonate 11/09/2023 28  21 - 32 mmol/L Final    Anion Gap 11/09/2023 9 (L)  10 - 20 mmol/L Final    Urea Nitrogen 11/09/2023 15  6 - 23 mg/dL Final    Creatinine 11/09/2023 0.75  0.50 - 1.05 mg/dL Final    eGFR 11/09/2023 >90  >60 mL/min/1.73m*2 Final    Calcium 11/09/2023 8.8  8.6 - 10.3 mg/dL Final    Albumin 11/09/2023 4.1  3.4 - 5.0 g/dL Final    Alkaline Phosphatase 11/09/2023 41  33 - 110 U/L Final    Total Protein 11/09/2023 6.2 (L)  6.4 - 8.2 g/dL Final    AST 11/09/2023 17  9 - 39 U/L Final    Bilirubin, Total 11/09/2023 0.5  0.0 - 1.2 mg/dL Final    ALT 11/09/2023 11  7 - 45 U/L Final     Intradermal skin tests were positive to:  TREES, mold #2 mix, and dog (gallardo mix).    Assessment/Plan   Diagnoses and all orders for this visit:  Angioedema, initial encounter  -     predniSONE (Deltasone) 20 mg tablet; Take 1 tablet (20 mg) by mouth 2 times a day for 5 days, THEN 1 tablet (20 mg) once daily for 5 days.  Allergic rhinitis due to other allergic trigger, unspecified seasonality    Unfortunately, it will be difficult to completely eliminate and prevent these skin attacks from occurring.     When the swelling occurs, take 40mg prednisone (2 pills x  20mg) daily for up to 2-3 days. Additional pills sent in for you to have at home.   Also benadryl 25-75mg   Ice packs  Goal: to improve your quality of life and minimize your symptoms.        Malick Gupta MD

## 2024-01-29 ENCOUNTER — APPOINTMENT (OUTPATIENT)
Dept: OPHTHALMOLOGY | Facility: CLINIC | Age: 49
End: 2024-01-29
Payer: OTHER GOVERNMENT

## 2024-02-01 ENCOUNTER — APPOINTMENT (OUTPATIENT)
Dept: ALLERGY | Facility: CLINIC | Age: 49
End: 2024-02-01
Payer: OTHER GOVERNMENT

## 2024-02-06 RX ORDER — PREDNISONE 20 MG/1
TABLET ORAL
Qty: 15 TABLET | Refills: 0 | Status: SHIPPED | OUTPATIENT
Start: 2024-02-06 | End: 2024-02-16

## 2024-02-26 ENCOUNTER — NUTRITION (OUTPATIENT)
Dept: NUTRITION | Facility: CLINIC | Age: 49
End: 2024-02-26
Payer: OTHER GOVERNMENT

## 2024-02-26 ENCOUNTER — LAB (OUTPATIENT)
Dept: LAB | Facility: LAB | Age: 49
End: 2024-02-26
Payer: OTHER GOVERNMENT

## 2024-02-26 VITALS — HEIGHT: 62 IN | BODY MASS INDEX: 25.79 KG/M2

## 2024-02-26 DIAGNOSIS — Z13.0 SCREENING FOR DEFICIENCY ANEMIA: ICD-10-CM

## 2024-02-26 DIAGNOSIS — R79.89 LOW SERUM TOTAL PROTEIN LEVEL: ICD-10-CM

## 2024-02-26 DIAGNOSIS — R74.8 ELEVATED VITAMIN B12 LEVEL: ICD-10-CM

## 2024-02-26 DIAGNOSIS — Z71.3 DIETARY COUNSELING AND SURVEILLANCE: Primary | ICD-10-CM

## 2024-02-26 LAB
BASOPHILS # BLD AUTO: 0.03 X10*3/UL (ref 0–0.1)
BASOPHILS NFR BLD AUTO: 0.7 %
EOSINOPHIL # BLD AUTO: 0.2 X10*3/UL (ref 0–0.7)
EOSINOPHIL NFR BLD AUTO: 4.5 %
ERYTHROCYTE [DISTWIDTH] IN BLOOD BY AUTOMATED COUNT: 12.4 % (ref 11.5–14.5)
FERRITIN SERPL-MCNC: 21 NG/ML (ref 8–150)
HCT VFR BLD AUTO: 41.7 % (ref 36–46)
HGB BLD-MCNC: 13.4 G/DL (ref 12–16)
IMM GRANULOCYTES # BLD AUTO: 0.01 X10*3/UL (ref 0–0.7)
IMM GRANULOCYTES NFR BLD AUTO: 0.2 % (ref 0–0.9)
IRON SATN MFR SERPL: 31 % (ref 25–45)
IRON SERPL-MCNC: 112 UG/DL (ref 35–150)
LYMPHOCYTES # BLD AUTO: 1.69 X10*3/UL (ref 1.2–4.8)
LYMPHOCYTES NFR BLD AUTO: 37.6 %
MCH RBC QN AUTO: 29.4 PG (ref 26–34)
MCHC RBC AUTO-ENTMCNC: 32.1 G/DL (ref 32–36)
MCV RBC AUTO: 91 FL (ref 80–100)
MONOCYTES # BLD AUTO: 0.44 X10*3/UL (ref 0.1–1)
MONOCYTES NFR BLD AUTO: 9.8 %
NEUTROPHILS # BLD AUTO: 2.12 X10*3/UL (ref 1.2–7.7)
NEUTROPHILS NFR BLD AUTO: 47.2 %
NRBC BLD-RTO: 0 /100 WBCS (ref 0–0)
PLATELET # BLD AUTO: 259 X10*3/UL (ref 150–450)
RBC # BLD AUTO: 4.56 X10*6/UL (ref 4–5.2)
TIBC SERPL-MCNC: 356 UG/DL (ref 240–445)
UIBC SERPL-MCNC: 244 UG/DL (ref 110–370)
VIT B12 SERPL-MCNC: 944 PG/ML (ref 211–911)
WBC # BLD AUTO: 4.5 X10*3/UL (ref 4.4–11.3)

## 2024-02-26 PROCEDURE — 36415 COLL VENOUS BLD VENIPUNCTURE: CPT

## 2024-02-26 PROCEDURE — 83540 ASSAY OF IRON: CPT

## 2024-02-26 PROCEDURE — 97802 MEDICAL NUTRITION INDIV IN: CPT | Performed by: FAMILY MEDICINE

## 2024-02-26 PROCEDURE — 82607 VITAMIN B-12: CPT

## 2024-02-26 PROCEDURE — 82728 ASSAY OF FERRITIN: CPT

## 2024-02-26 PROCEDURE — 83550 IRON BINDING TEST: CPT

## 2024-02-26 PROCEDURE — 85025 COMPLETE CBC W/AUTO DIFF WBC: CPT

## 2024-02-26 NOTE — PROGRESS NOTES
"Nutrition Assessment     Reason for Visit:  Malena Person is a 48 y.o. female who presents for No chief complaint on file.    Anthropometrics:  Anthropometrics  Height: 157.5 cm (5' 2\")  Weight Change  Weight History / % Weight Change: (12/13/23) 141#, BMI: 25.4  Significant Weight Loss: No         Food And Nutrient Intake:  Food and Nutrient History  Food and Nutrient History: Pt was referred by Alejandra Coronado on 12/13/23. Pt states she has gained weight, she is eating well and exercising. She feels like something isn't right. She has a teenager at home and a  who is very active and eats very healthy. She is doing intermittent fasting eating from 10 am - 6 pm. She is always tired. Pt and her family moving again this summer. Her  is in the  so they move a lot. She keeps food logs, myfitnesspal.   She does eat when she is stressed.  Energy Intake: Good > 75 %  Fluid Intake: coffee w/cream - collagen and beet juice - 8 oz w/collagen, caffeine drink - 8:15 am, 96 oz water  Food Allergy: none  Food Intolerance: lactose - eats a little now  GI Symptoms: constipation  GI Symptoms greater than 2 weeks: yes (has a BM once a week)  Oral Problems: denies  Dentition: own  Sleep Duration/Quality: 7+ hrs continuous     Food Intake  Amount of Food: Wake - 5:20 am, goes to the gym first, [employer]  Meal 1: B: Fairlife shake 30 grams and 150 mg or Triple Greek Yogurt and nuts, whey protein - 10 am  Meal 2: L: leftovers or salads - chicken, spinach, carrots, peppers, cucumbers, sometimes pretzels, dressing, sometimes quinoa and cottage cheese  11:30 am  Meal 3: D: 5-6 pm protein, green vegetable, rice or potato  -  Snacks: 2 pm - protein bar, caffeine drink - Celsius, 3 pm - hummus, pretzels, sometimes brownie bites    Food Preparation  Cooking: Caretaker  Grocery Shopping: Caretaker  Dining Out: 1 to 3 times a week                                  Micronutrient Intake  Vitamin Intake: D, " "Multivitamin  Mineral/Element Intake: Other (Comment)                Food And Nutrient Administration:                        Factors:                         Physical Activities:  Physical Activity  Bed/Chair Ridden: No  Physical Activity History: [employer] - cardio and weights- 4 days a week, spinning - 1 day a week  Frequency (times/week): 5 (times/week)  Duration (minutes/day): 60 minutes/day  Type of Physical Activity: cardio and weights           Knowledge Beliefs Attitudes and Behavior                                       Nutrition Focused Physical Exam:           Energy Needs  Calculated Energy Needs Using Equations  Height: 157.5 cm (5' 2\")  Weight Used for Equation Calculations: 64 kg (141 lb)  [place]- St. Jeor Equation (Overweight or Obese Patients): 1233  Activity Factor: 1.55  Estimated Energy Needs  Total Energy Estimated Needs (kCal): 1900 kCal  Method for Estimating Needs: MSJ x 1.55  Estimated Protein Needs  Total Protein Estimated Needs (g): 80 g  Method for Estimating Needs: 20% of total calorie intake        Nutrition Diagnosis      Nutrition Diagnosis  Patient has Nutrition Diagnosis: No  Diagnosis Status (1): New  Nutrition Diagnosis 1: Food and nutrition related knowledge deficit  Related to (1): Lack of or limited prior nutrition-related education  As Evidenced by (1): diet recall, need for diet protein and starch at all meals and snacks, focusing on increasing carbohydrate intake at each meal and snack  Additional Nutrition Diagnosis: Diagnosis 2  Diagnosis Status (2): New  Nutrition Diagnosis 2: Altered GI function  Related to (2): possible excessive protein intake and inadequate fiber intake as a result.  As Evidenced by (2): recall of constipation, only having 1 BM per week    Nutrition Interventions/Recommendations            Patient Instructions   1) Introduced patient to using a scale of 1-10 to rate hunger/satiety. Reviewed signs of hunger that patient might or might not feel " "in their body, and encouraged being attentive to notice these signals if they are present. Encouraged patient to honor hunger by eating when feeling at a \"3\" instead of waiting for hunger to become more severe. Encouraged using this method in conjunction with balanced foods on the plate, using the Plate Method.       2)     We reviewed the importance of having consistent meals and snacks throughout the day to improve or maintain good glycemic control and to increase metabolism to aid with weight loss. Pt should aim to consume a meal or snack every 3-4 hours which contains a lean protein (lean chicken, turkey, fish, eggs, low fat yogurt, nuts, peanut butter, bean) and healthy starch (fruits, whole grains)    3) We reviewed the food plate method to help improve healthy eating habits. We discussed that half of the plate should contain 2 non-starchy vegetables (all vegetable besides peas, corn and potatoes), 1/4 of the plate should contain lean protein and 1/4 of the plate should contain a healthy starch.    4) Reviewed the importance of consistent physical activity of 150 min per week which includes 2 day of strength training for 30 min     Nutrition Monitoring and Evaluation   Food/Nutrient Related History Monitoring  Additional Plan: protein and starch at all meals and snacks, eat breakfast before gym, eat every 3 - 4 hours, can try metamucil at breakfast for constipation, decrease caffeine consumption to 250 -300 mg per day          Time Spent  Prep time on day of patient encounter: 15 minutes  Time spent directly with patient, family or caregiver: 60 minutes  Documentation Time: 20 minutes                    "

## 2024-02-26 NOTE — PATIENT INSTRUCTIONS
"1) Introduced patient to using a scale of 1-10 to rate hunger/satiety. Reviewed signs of hunger that patient might or might not feel in their body, and encouraged being attentive to notice these signals if they are present. Encouraged patient to honor hunger by eating when feeling at a \"3\" instead of waiting for hunger to become more severe. Encouraged using this method in conjunction with balanced foods on the plate, using the Plate Method.       2)     We reviewed the importance of having consistent meals and snacks throughout the day to improve or maintain good glycemic control and to increase metabolism to aid with weight loss. Pt should aim to consume a meal or snack every 3-4 hours which contains a lean protein (lean chicken, turkey, fish, eggs, low fat yogurt, nuts, peanut butter, bean) and healthy starch (fruits, whole grains)    3) We reviewed the food plate method to help improve healthy eating habits. We discussed that half of the plate should contain 2 non-starchy vegetables (all vegetable besides peas, corn and potatoes), 1/4 of the plate should contain lean protein and 1/4 of the plate should contain a healthy starch.    4) Reviewed the importance of consistent physical activity of 150 min per week which includes 2 day of strength training for 30 min   "

## 2024-03-08 ENCOUNTER — OFFICE VISIT (OUTPATIENT)
Dept: PRIMARY CARE | Facility: CLINIC | Age: 49
End: 2024-03-08
Payer: OTHER GOVERNMENT

## 2024-03-08 ENCOUNTER — HOSPITAL ENCOUNTER (OUTPATIENT)
Dept: RADIOLOGY | Facility: CLINIC | Age: 49
Discharge: HOME | End: 2024-03-08
Payer: OTHER GOVERNMENT

## 2024-03-08 VITALS
DIASTOLIC BLOOD PRESSURE: 64 MMHG | TEMPERATURE: 97.2 F | SYSTOLIC BLOOD PRESSURE: 102 MMHG | WEIGHT: 143 LBS | BODY MASS INDEX: 26.16 KG/M2 | HEART RATE: 72 BPM | OXYGEN SATURATION: 96 %

## 2024-03-08 DIAGNOSIS — M54.50 ACUTE BILATERAL LOW BACK PAIN WITHOUT SCIATICA: ICD-10-CM

## 2024-03-08 DIAGNOSIS — M54.50 ACUTE BILATERAL LOW BACK PAIN WITHOUT SCIATICA: Primary | ICD-10-CM

## 2024-03-08 DIAGNOSIS — S86.812A STRAIN OF CALF MUSCLE, LEFT, INITIAL ENCOUNTER: ICD-10-CM

## 2024-03-08 PROCEDURE — 3008F BODY MASS INDEX DOCD: CPT | Performed by: FAMILY MEDICINE

## 2024-03-08 PROCEDURE — 99214 OFFICE O/P EST MOD 30 MIN: CPT | Performed by: FAMILY MEDICINE

## 2024-03-08 PROCEDURE — 1036F TOBACCO NON-USER: CPT | Performed by: FAMILY MEDICINE

## 2024-03-08 PROCEDURE — 72100 X-RAY EXAM L-S SPINE 2/3 VWS: CPT | Performed by: RADIOLOGY

## 2024-03-08 PROCEDURE — 72100 X-RAY EXAM L-S SPINE 2/3 VWS: CPT

## 2024-03-08 RX ORDER — CYCLOBENZAPRINE HCL 10 MG
10 TABLET ORAL 2 TIMES DAILY PRN
Qty: 20 TABLET | Refills: 0 | Status: SHIPPED | OUTPATIENT
Start: 2024-03-08 | End: 2024-05-07

## 2024-03-08 RX ORDER — IBUPROFEN 800 MG/1
800 TABLET ORAL EVERY 8 HOURS PRN
COMMUNITY

## 2024-03-08 ASSESSMENT — ENCOUNTER SYMPTOMS
LEG PAIN: 1
NUMBNESS: 0
BOWEL INCONTINENCE: 0
WEAKNESS: 0
FEVER: 0
PARESIS: 0
HEADACHES: 0
BACK PAIN: 1
DYSURIA: 0
ABDOMINAL PAIN: 0
PARESTHESIAS: 0
PERIANAL NUMBNESS: 0
WEIGHT LOSS: 0
TINGLING: 0

## 2024-03-08 NOTE — PROGRESS NOTES
Subjective   Patient ID: Malena Person is a 48 y.o. female who presents for calf pain (Lower L calf pain x 4 days) and ER Follow-up (Urgent care follow up from [facility] on 3/6 Re: lower back and hip pain after botox).    Malena has been having pain in left calf. Started during sprint work-up. Felt pop then had trouble walking. Pain has been improving over last feew days. Has been stretching.     Is having low back pain. Some radiation to hip. Feels like stabbing pain. Certain movements make it hurt worse. Using muscle relaxer and ibuprofen as needed which helps.     Back Pain  This is a recurrent problem. The current episode started yesterday. The problem occurs intermittently. The problem is unchanged. The pain is present in the lumbar spine and sacro-iliac. The quality of the pain is described as aching, shooting and stabbing. The pain does not radiate. The pain is at a severity of 8/10. The pain is The same all the time. The symptoms are aggravated by position and stress. Stiffness is present All day. Associated symptoms include leg pain. Pertinent negatives include no abdominal pain, bladder incontinence, bowel incontinence, chest pain, dysuria, fever, headaches, numbness, paresis, paresthesias, pelvic pain, perianal numbness, tingling, weakness or weight loss. Risk factors include recent trauma.        Review of Systems   Constitutional:  Negative for fever and weight loss.   Cardiovascular:  Negative for chest pain.   Gastrointestinal:  Negative for abdominal pain and bowel incontinence.   Genitourinary:  Negative for bladder incontinence, dysuria and pelvic pain.   Musculoskeletal:  Positive for back pain.   Neurological:  Negative for tingling, weakness, numbness, headaches and paresthesias.       Objective   /64   Pulse 72   Temp 36.2 °C (97.2 °F)   Wt 64.9 kg (143 lb)   SpO2 96%   BMI 26.16 kg/m²     Physical Exam  Constitutional:       General: She is not in acute distress.      Appearance: Normal appearance.   HENT:      Head: Normocephalic.   Pulmonary:      Effort: Pulmonary effort is normal.   Musculoskeletal:      Lumbar back: Spasms and tenderness present. No swelling. Negative right straight leg raise test and negative left straight leg raise test.      Left lower leg: Tenderness (medial gastrocnemius) present. No swelling or deformity.   Neurological:      General: No focal deficit present.      Mental Status: She is alert.   Psychiatric:         Mood and Affect: Mood normal.         Assessment/Plan   Diagnoses and all orders for this visit:  Acute bilateral low back pain without sciatica  Comments:  Check XR. Refer to PT. Continue cyclobenzaprine and ibuprofen as needed.  Orders:  -     XR lumbar spine 2-3 views; Future  -     Referral to Physical Therapy; Future  -     cyclobenzaprine (Flexeril) 10 mg tablet; Take 1 tablet (10 mg) by mouth 2 times a day as needed for muscle spasms.  Strain of calf muscle, left, initial encounter  Comments:  Improving. Continue heat, stretching.